# Patient Record
Sex: MALE | Race: WHITE | NOT HISPANIC OR LATINO
[De-identification: names, ages, dates, MRNs, and addresses within clinical notes are randomized per-mention and may not be internally consistent; named-entity substitution may affect disease eponyms.]

---

## 2021-11-10 ENCOUNTER — NON-APPOINTMENT (OUTPATIENT)
Age: 26
End: 2021-11-10

## 2021-11-10 ENCOUNTER — APPOINTMENT (OUTPATIENT)
Dept: INTERNAL MEDICINE | Facility: CLINIC | Age: 26
End: 2021-11-10
Payer: MEDICAID

## 2021-11-10 VITALS
HEART RATE: 60 BPM | HEIGHT: 70 IN | SYSTOLIC BLOOD PRESSURE: 130 MMHG | WEIGHT: 180 LBS | OXYGEN SATURATION: 100 % | TEMPERATURE: 98.1 F | RESPIRATION RATE: 16 BRPM | BODY MASS INDEX: 25.77 KG/M2 | DIASTOLIC BLOOD PRESSURE: 74 MMHG

## 2021-11-10 DIAGNOSIS — Z00.00 ENCOUNTER FOR GENERAL ADULT MEDICAL EXAMINATION W/OUT ABNORMAL FINDINGS: ICD-10-CM

## 2021-11-10 DIAGNOSIS — Z23 ENCOUNTER FOR IMMUNIZATION: ICD-10-CM

## 2021-11-10 DIAGNOSIS — F41.9 ANXIETY DISORDER, UNSPECIFIED: ICD-10-CM

## 2021-11-10 DIAGNOSIS — Z87.09 PERSONAL HISTORY OF OTHER DISEASES OF THE RESPIRATORY SYSTEM: ICD-10-CM

## 2021-11-10 DIAGNOSIS — Z78.9 OTHER SPECIFIED HEALTH STATUS: ICD-10-CM

## 2021-11-10 DIAGNOSIS — F32.A ANXIETY DISORDER, UNSPECIFIED: ICD-10-CM

## 2021-11-10 PROCEDURE — 90686 IIV4 VACC NO PRSV 0.5 ML IM: CPT

## 2021-11-10 PROCEDURE — 96127 BRIEF EMOTIONAL/BEHAV ASSMT: CPT

## 2021-11-10 PROCEDURE — 93000 ELECTROCARDIOGRAM COMPLETE: CPT

## 2021-11-10 PROCEDURE — 99385 PREV VISIT NEW AGE 18-39: CPT | Mod: 25

## 2021-11-10 PROCEDURE — 90471 IMMUNIZATION ADMIN: CPT

## 2021-11-10 RX ORDER — CLONAZEPAM 2 MG/1
TABLET ORAL
Refills: 0 | Status: ACTIVE | COMMUNITY

## 2021-11-10 RX ORDER — SERTRALINE HYDROCHLORIDE 25 MG/1
25 TABLET, FILM COATED ORAL
Refills: 0 | Status: ACTIVE | COMMUNITY

## 2021-11-10 RX ORDER — SERTRALINE HYDROCHLORIDE 100 MG/1
100 TABLET, FILM COATED ORAL
Refills: 0 | Status: ACTIVE | COMMUNITY

## 2021-11-10 NOTE — ASSESSMENT
[FreeTextEntry1] : Reviewed age appropriate preventive screening, discussed importance of healthy diet and routine physical activity

## 2021-11-10 NOTE — HEALTH RISK ASSESSMENT
[0] : 2) Feeling down, depressed, or hopeless: Not at all (0) [PHQ-2 Negative - No further assessment needed] : PHQ-2 Negative - No further assessment needed [Employed] : employed [[id number]] : 0 [Reports changes in hearing] : Reports no changes in hearing [Reports changes in vision] : Reports no changes in vision [Reports changes in dental health] : Reports no changes in dental health

## 2021-11-10 NOTE — HISTORY OF PRESENT ILLNESS
[FreeTextEntry1] : CPE [de-identified] : Here for CPE\par Regularly exercise, balanced diet\par Mood is stable, follows w/ mental health provider, on zoloft, just increased dose today\par Had viral infection last week-home COVID test negative, concerned about mono, symptoms consisted of nasal congestion\par

## 2021-11-11 LAB
25(OH)D3 SERPL-MCNC: 25.2 NG/ML
ALBUMIN SERPL ELPH-MCNC: 5 G/DL
ALP BLD-CCNC: 64 U/L
ALT SERPL-CCNC: 19 U/L
ANION GAP SERPL CALC-SCNC: 12 MMOL/L
AST SERPL-CCNC: 14 U/L
BASOPHILS # BLD AUTO: 0.05 K/UL
BASOPHILS NFR BLD AUTO: 0.8 %
BILIRUB SERPL-MCNC: 0.6 MG/DL
BUN SERPL-MCNC: 14 MG/DL
CALCIUM SERPL-MCNC: 10.1 MG/DL
CHLORIDE SERPL-SCNC: 102 MMOL/L
CHOLEST SERPL-MCNC: 186 MG/DL
CO2 SERPL-SCNC: 25 MMOL/L
CREAT SERPL-MCNC: 0.93 MG/DL
EOSINOPHIL # BLD AUTO: 0.38 K/UL
EOSINOPHIL NFR BLD AUTO: 5.9 %
ESTIMATED AVERAGE GLUCOSE: 88 MG/DL
GLUCOSE SERPL-MCNC: 98 MG/DL
HBA1C MFR BLD HPLC: 4.7 %
HCT VFR BLD CALC: 51 %
HDLC SERPL-MCNC: 51 MG/DL
HGB BLD-MCNC: 16.6 G/DL
IMM GRANULOCYTES NFR BLD AUTO: 0.2 %
LDLC SERPL CALC-MCNC: 119 MG/DL
LYMPHOCYTES # BLD AUTO: 1.53 K/UL
LYMPHOCYTES NFR BLD AUTO: 23.8 %
MAN DIFF?: NORMAL
MCHC RBC-ENTMCNC: 32.5 GM/DL
MCHC RBC-ENTMCNC: 32.5 PG
MCV RBC AUTO: 100 FL
MONOCYTES # BLD AUTO: 0.65 K/UL
MONOCYTES NFR BLD AUTO: 10.1 %
NEUTROPHILS # BLD AUTO: 3.81 K/UL
NEUTROPHILS NFR BLD AUTO: 59.2 %
NONHDLC SERPL-MCNC: 135 MG/DL
PLATELET # BLD AUTO: 285 K/UL
POTASSIUM SERPL-SCNC: 5 MMOL/L
PROT SERPL-MCNC: 7.4 G/DL
RBC # BLD: 5.1 M/UL
RBC # FLD: 12.1 %
SODIUM SERPL-SCNC: 139 MMOL/L
TRIGL SERPL-MCNC: 80 MG/DL
TSH SERPL-ACNC: 1.88 UIU/ML
WBC # FLD AUTO: 6.43 K/UL

## 2021-11-12 ENCOUNTER — NON-APPOINTMENT (OUTPATIENT)
Age: 26
End: 2021-11-12

## 2021-11-12 LAB — HETEROPH AB SER QL: NEGATIVE

## 2024-07-08 ENCOUNTER — APPOINTMENT (OUTPATIENT)
Dept: GASTROENTEROLOGY | Facility: CLINIC | Age: 29
End: 2024-07-08
Payer: COMMERCIAL

## 2024-07-08 VITALS
BODY MASS INDEX: 25.62 KG/M2 | SYSTOLIC BLOOD PRESSURE: 125 MMHG | HEART RATE: 65 BPM | WEIGHT: 179 LBS | HEIGHT: 70 IN | RESPIRATION RATE: 3 BRPM | OXYGEN SATURATION: 98 % | DIASTOLIC BLOOD PRESSURE: 76 MMHG | TEMPERATURE: 98.3 F

## 2024-07-08 DIAGNOSIS — K64.9 UNSPECIFIED HEMORRHOIDS: ICD-10-CM

## 2024-07-08 DIAGNOSIS — Z78.9 OTHER SPECIFIED HEALTH STATUS: ICD-10-CM

## 2024-07-08 DIAGNOSIS — L05.91 PILONIDAL CYST W/OUT ABSCESS: ICD-10-CM

## 2024-07-08 DIAGNOSIS — K59.00 CONSTIPATION, UNSPECIFIED: ICD-10-CM

## 2024-07-08 DIAGNOSIS — K92.1 MELENA: ICD-10-CM

## 2024-07-08 PROCEDURE — 99204 OFFICE O/P NEW MOD 45 MIN: CPT

## 2024-07-08 RX ORDER — HYDROCORTISONE 25 MG/G
2.5 CREAM TOPICAL TWICE DAILY
Qty: 1 | Refills: 2 | Status: ACTIVE | COMMUNITY
Start: 2024-07-08 | End: 1900-01-01

## 2024-07-08 RX ORDER — SODIUM PICOSULFATE, MAGNESIUM OXIDE, AND ANHYDROUS CITRIC ACID 12; 3.5; 1 G/175ML; G/175ML; MG/175ML
10-3.5-12 MG-GM LIQUID ORAL
Qty: 1 | Refills: 0 | Status: ACTIVE | COMMUNITY
Start: 2024-07-08 | End: 1900-01-01

## 2024-08-15 ENCOUNTER — APPOINTMENT (OUTPATIENT)
Dept: GASTROENTEROLOGY | Facility: AMBULATORY MEDICAL SERVICES | Age: 29
End: 2024-08-15
Payer: COMMERCIAL

## 2024-08-15 PROCEDURE — 45380 COLONOSCOPY AND BIOPSY: CPT

## 2024-09-18 ENCOUNTER — APPOINTMENT (OUTPATIENT)
Dept: GASTROENTEROLOGY | Facility: CLINIC | Age: 29
End: 2024-09-18
Payer: COMMERCIAL

## 2024-09-18 DIAGNOSIS — K64.9 UNSPECIFIED HEMORRHOIDS: ICD-10-CM

## 2024-09-18 DIAGNOSIS — K58.9 IRRITABLE BOWEL SYNDROME W/OUT DIARRHEA: ICD-10-CM

## 2024-09-18 PROCEDURE — 99213 OFFICE O/P EST LOW 20 MIN: CPT
